# Patient Record
Sex: FEMALE | Race: WHITE | NOT HISPANIC OR LATINO | URBAN - METROPOLITAN AREA
[De-identification: names, ages, dates, MRNs, and addresses within clinical notes are randomized per-mention and may not be internally consistent; named-entity substitution may affect disease eponyms.]

---

## 2024-08-15 ENCOUNTER — OFFICE VISIT (OUTPATIENT)
Dept: POSTPARTUM | Facility: CLINIC | Age: 34
End: 2024-08-15
Payer: COMMERCIAL

## 2024-08-15 PROCEDURE — 99404 PREV MED CNSL INDIV APPRX 60: CPT | Performed by: PEDIATRICS

## 2024-08-15 NOTE — PATIENT INSTRUCTIONS
"-Great meeting with you and your sweet baby girl today!   -Continue to feed baby Attila on demand.   -Watch for signs throughout the feeding that baby is effectively removing milk. You will feel strong tugging, hear swallowing and will feel your breasts get softer after nursing.   - Switch breasts when she shows decreased active drinking. Place finger in the corner of her mouth to break suction before removing her from the breast.   -No need to pump if baby is latching and feeding well at the breast on demand.   -Pump only as needed for missed feedings at the breast or for uncomfortable engorgement, utilize flange fit and settings that are comfortable for you, pumping should not be painful! Saving 1-2 ounces per day as a \"rainy day\" supply is okay if desired.   -Cold compresses, like a bag of peas applied over a thin blanket or towel to the breast, is recommended for breast comfort and decreasing inflammation/engorgement in the breast. You can also take ibuprofen as needed is safe per your PCP.   -Healing techniques for nipples : lanolin and airflow, or apply lanolin and cover with a piece of wax or parchment paper over top in between feedings.   - Storing and Thawing Breast Milk  [facility] Breastfeeding Support ([place].gov)    -Please call or scheduled a follow up appointment with lactation as needed for questions or concerns you may have.     "

## 2024-08-15 NOTE — PROGRESS NOTES
"INITIAL BREAST FEEDING EVALUATION    Informant/Relationship: Zara (mom/self), Daljit (FOB)     Discussion of General Lactation Issues: Zara had a few things to address today. Attila's latch has been shallow the last few days, although Mom is not in pain. She is pushing and squirming during feeding recently as well. Also Attila is very nosily while eating, feels this may be a \"stridor\" sound. She is gaining well, and does not appear to have increased WOB when eating.     Infant is 23 days old today.        History:  Fertility Problem:yes - baby was conceived via IVF  Breast changes:yes - enlargement, tenderness, color changes   :  no, Mom was induced for gestational HTN, inductions went well, Mom pushed for 2 hours, labored down for 2 hours, 45 min of pushing, baby was not descending. Baby was born via C/S, Mom had a bladder injury. Baby had a large bruise on her head for a few days.   Full term:yes - 38 weeks and 4 days    labor:no  First nursing/attempt < 1 hour after birth:yes - in OR at [facility]  Skin to skin following delivery:yes - immediately in OR  Breast changes after delivery:yes - milk came 3 days postpartum   Rooming in (infant in room with mother with exception of procedures, eg. Circumcision:  went just for 2 hours in middle of the night for a breast   Blood sugar issues:no  NICU stay:no  Jaundice:no - just rechecked   Phototherapy:no  Supplement given: (list supplement and method used as well as reason(s):no    No past medical history on file.    No current outpatient medications on file.    Not on File    Social History     Substance and Sexual Activity   Drug Use Not on file       Social History     Interval Breastfeeding History:    Frequency of breast feeding: every 2-3 hours, Mom is feeding on demand.   Does mother feel breastfeeding is effective: Yes  Does infant appear satisfied after nursing:Yes  Stooling pattern normal: lYes  Urinating frequently:Yes  Using shield or shells: " "No    Alternative/Artificial Feedings:   Bottle: Yes, Dr. Abreu's Preemie nipple   Cup: No  Syringe/Finger: No           Formula Type: no                     Amount: n/a            Breast Milk:                      Amount: about 2 ounces each time             Frequency Q has taken two bottles total   Elimination Problems: No    Baby is noisy taking the bottles as well, but takes them without issues.     Equipment:    Pump            Type: Spectra and wearable Zomee fit             Frequency of Use: 1 x per day after a nursing session      Equipment Problems: yes zomee pump is uncomfortable for her, Spectra is comfortable     Mom:  Breast: small/medium breasts, firm, full feeling glandular tissue throughout.  Nipple Assessment in General: Normal: elongated/eraser, no discoloration and no damage noted.  Mother's Awareness of Feeding Cues                 Recognizes: Yes                  Verbalizes: Yes  Support System: good support   History of Breastfeeding: first time breastfeeding   Changes/Stressors/Violence: Concerns about how she sounds when she's feeding, shallow latch seems to come and go. She is \"fighting\" to latch often.   Concerns/Goals: Continue breastfeeding, exclusively, planning to incorporate bottles and pumping when Mom returns to work.     Problems with Mom: none     Physical Exam  Constitutional:       Appearance: Normal appearance.   HENT:      Head: Normocephalic.   Pulmonary:      Effort: Pulmonary effort is normal.   Musculoskeletal:         General: Normal range of motion.      Cervical back: Normal range of motion.   Neurological:      General: No focal deficit present.      Mental Status: She is alert and oriented to person, place, and time.   Skin:     General: Skin is warm.      Capillary Refill: Capillary refill takes less than 2 seconds.   Psychiatric:         Mood and Affect: Mood normal.         Behavior: Behavior normal.         Thought Content: Thought content normal.         Judgment: " Judgment normal.       Infant:  Behaviors: Alert  Color: Pink  Birth weight: 3595 g  Current weight: 4400 g     Problems with infant: tension, signs of fatigue when sucking       General Appearance:  Alert, active, no distress                            Head:  Normocephalic, AFOF, sutures opposed                            Eyes:   Conjunctiva clear, no drainage                            Ears:   Normally placed, no anomolies                           Nose:   Septum intact, no drainage or erythema                          Mouth:  No lesions. Tongue is at rest at the roof of her mouth, lateralizes well, extends past her lip. She gapes well, quickly cups finger, full firm cup, but often takes a break and tongue quivers briefly. Manual lift, jaw tension noted, frenulum connects well behind alveolar ridge and tongue tip.                    Neck:  Supple, symmetrical, trachea midline                Respiratory:  No grunting, flaring, retractions, breath sounds clear and equal           Cardiovascular:  Regular rate and rhythm. No murmur. Adequate perfusion/capillary refill. Femoral pulse present                  Abdomen:    Soft, non-tender, no masses, bowel sounds present, no HSM            Genitourinary:  Normal female genitalia, anus patent                         Spine:   No abnormalities noted       Musculoskeletal:   Full range of motion         Skin/Hair/Nails:   Skin warm, dry, and intact, no rashes or abnormal dyspigmentation or lesions               Neurologic:   No abnormal movement, tone appropriate for gestational age    [place] Latch:  Efficiency:               Lips Flanged: Yes, with positioning adjustments               Depth of latch: wide with positioning adjustments and areolar compression               Audible Swallow: Yes, gulping              Visible Milk: Yes              Wide Open/ Asymmetrical: Yes              Suck Swallow Cycle: Breathing: yes, Coordinated: yes  Nipple Assessment after latch:  Flat, pinched, but baby is pulled off the breast before latch is broken.   Latch Problems: She has a narrow gape and shallow attachment initially. Adjusted her head angle to reach up for the breast and demonstrated areolar compression. With a few attempts we are able to achieve a deep, comfortable latch. She is a noisy feeder, but only does the high pitched, gasp sound 1-2 times. She is comfortably and calmly feeding.     Position:  Infant's Ergonomics/Body               Body Alignment: Yes               Head Supported: Yes               Close to Mom's body/ Lifted/ Supported: Yes               Mom's Ergonomics/Body: Yes                           Supported: Yes                           Sitting Back: Yes                           Brings Baby to her breast: Yes  Positioning Problems: Mom aligns baby properly in cross cradle hold. Reviewed BN hold and Mom returns the demonstration well.     Education:  Reviewed Latch: importance of deep latch without pain.   Reviewed Positioning for Dyad: proper alignment and head angle when positioning at the breast   Reviewed Frequency/Supply & Demand: offer the breast at each feeding, pump if baby is not latching and effective transferring milk.   Reviewed Infant:Cues and varied States of Awareness: watch for hunger cues, feed on demand. If baby seems satisfied at the breast (calm, relaxed sleeping, breasts are softer) no need to pump or supplement   Reviewed Infant Elimination: goal of 6+ wets and 2-3 stools per day   Reviewed Alternative/Artificial Feedings: paced bottle feeding technique demonstrated  Reviewed Mom/Breast care: gentle handling of the breast at all times, discussed lymphatic drainage and reverse pressure softening, as well as tips for healing sore nipples.    Reviewed Equipment: Hand pump and electric pump general guidance, Discussed proper flange fit, how to measure        Plan:      Reassurance provided that baby is growing well at this time and appears to be  feeding comfortably and without difficulties. Cont with positioning adjustments and watch for signs of deep attachment and effective feeding. Pump only if wanting to replace feeding at the breast with bottle feeding or if latching becomes painful. If saving for return to work, only small volumes daily are recommended. Cold compresses and ibuprofen discussed for engorgement relief. Gentle handling of the breast at all times to preserve integrity.  Contact [facility] for breastfeeding support as needed or ongoing concerns with latching comfort and milk transfer.      I have spent 90 minutes with Patient and family today in which greater than 50% of this time was spent in counseling/coordination of care regarding Patient and family education.

## 2024-08-18 NOTE — PROGRESS NOTES
I have reviewed the notes, assessments, and/or procedures performed by Claudia Agustin RN, IBCLC, I concur with her/his documentation of Perlita Jiménez MD 08/18/24